# Patient Record
Sex: MALE | Race: WHITE
[De-identification: names, ages, dates, MRNs, and addresses within clinical notes are randomized per-mention and may not be internally consistent; named-entity substitution may affect disease eponyms.]

---

## 2023-03-10 ENCOUNTER — HOSPITAL ENCOUNTER (OUTPATIENT)
Dept: HOSPITAL 33 - SDC | Age: 53
Discharge: HOME | End: 2023-03-10
Attending: FAMILY MEDICINE
Payer: COMMERCIAL

## 2023-03-10 VITALS — SYSTOLIC BLOOD PRESSURE: 154 MMHG | DIASTOLIC BLOOD PRESSURE: 84 MMHG | HEART RATE: 58 BPM

## 2023-03-10 VITALS — OXYGEN SATURATION: 98 %

## 2023-03-10 DIAGNOSIS — D12.5: ICD-10-CM

## 2023-03-10 DIAGNOSIS — D12.3: ICD-10-CM

## 2023-03-10 DIAGNOSIS — Z12.11: Primary | ICD-10-CM

## 2023-03-10 NOTE — OP
SURGERY DATE/TIME:  03/10/2023  0726    



PREOPERATIVE DIAGNOSIS:      Screening examination. 



POSTOPERATIVE DIAGNOSIS:      Small polyps in the transverse and sigmoid colon.



PROCEDURE:    Colonoscopy with cold forceps biopsy.



SURGEON:        Dr. Dyer.



ANESTHESIA:    MAC. Medications given by anesthesia department. 



HISTORY:  The patient is a 53-year-old white male presents now for colonoscopic 
evaluation. He reports no problems. It is his first screening exam. The patient was 
described the risks of the procedure including the risk of perforation, phlebitis, 
untoward reaction to medication, bleeding and missed lesions. The patient verbalized his 
understanding and desired to have the procedure performed.



DESCRIPTION OF PROCEDURE:  The patient was given the medications by the anesthesia 
department. He had continuous pulse oximetry, ECG monitoring and intermittent blood 
pressure monitoring during the examination. He was placed in the left lateral decubitus 
position. Digital rectal examination was performed and revealed normal anal sphincter 
tone, no masses and a normal prostate. The flexible Olympus pediatric colonoscope was used 
to intubate the rectum. A view of the colon was developed sequentially to the cecum. Upon 
insertion and withdrawal, including a retroflex view in the rectum, was noted a few small 
polyps in the transverse and sigmoid colon. These were biopsied using cold biopsy 
technique and basically destroyed the lesions. The scope was removed from the patient who 
tolerated the procedure well and was sent back to OP recovery in good condition. The prep 
was noted to be fair to good.